# Patient Record
Sex: MALE | Race: WHITE | NOT HISPANIC OR LATINO | URBAN - METROPOLITAN AREA
[De-identification: names, ages, dates, MRNs, and addresses within clinical notes are randomized per-mention and may not be internally consistent; named-entity substitution may affect disease eponyms.]

---

## 2019-05-08 ENCOUNTER — APPOINTMENT (RX ONLY)
Dept: URBAN - METROPOLITAN AREA CLINIC 37 | Facility: CLINIC | Age: 29
Setting detail: DERMATOLOGY
End: 2019-05-08

## 2019-05-08 DIAGNOSIS — D22 MELANOCYTIC NEVI: ICD-10-CM

## 2019-05-08 DIAGNOSIS — L72.8 OTHER FOLLICULAR CYSTS OF THE SKIN AND SUBCUTANEOUS TISSUE: ICD-10-CM

## 2019-05-08 DIAGNOSIS — L82.1 OTHER SEBORRHEIC KERATOSIS: ICD-10-CM

## 2019-05-08 PROBLEM — L85.3 XEROSIS CUTIS: Status: ACTIVE | Noted: 2019-05-08

## 2019-05-08 PROBLEM — D22.5 MELANOCYTIC NEVI OF TRUNK: Status: ACTIVE | Noted: 2019-05-08

## 2019-05-08 PROCEDURE — 99203 OFFICE O/P NEW LOW 30 MIN: CPT

## 2019-05-08 NOTE — HPI: SKIN LESION
What Type Of Note Output Would You Prefer (Optional)?: Bullet Format
Has Your Skin Lesion Been Treated?: not been treated
Is This A New Presentation, Or A Follow-Up?: Skin Lesions
Is This A New Presentation, Or A Follow-Up?: Skin Lesion

## 2020-09-30 ENCOUNTER — NEW PATIENT (OUTPATIENT)
Age: 30
End: 2020-09-30

## 2020-09-30 DIAGNOSIS — H52.13: ICD-10-CM

## 2020-09-30 DIAGNOSIS — H04.123: ICD-10-CM

## 2020-09-30 PROCEDURE — 92310 CONTACT LENS FITTING OU: CPT

## 2020-09-30 PROCEDURE — 99204 OFFICE O/P NEW MOD 45 MIN: CPT

## 2020-09-30 ASSESSMENT — KERATOMETRY
OD_AXISANGLE_DEGREES: 109
OD_K2POWER_DIOPTERS: 40
OD_AXISANGLE2_DEGREES: 19
OD_K1POWER_DIOPTERS: 32.75
OS_K1POWER_DIOPTERS: 44
OS_AXISANGLE_DEGREES: 3
OS_K2POWER_DIOPTERS: 44.75
OS_AXISANGLE2_DEGREES: 93

## 2020-09-30 ASSESSMENT — VISUAL ACUITY
OD_CC: 20/20
OS_CC: 20/25

## 2020-09-30 ASSESSMENT — TONOMETRY
OD_IOP_MMHG: 14
OS_IOP_MMHG: 16

## 2021-12-20 ENCOUNTER — APPOINTMENT (RX ONLY)
Dept: URBAN - METROPOLITAN AREA CLINIC 37 | Facility: CLINIC | Age: 31
Setting detail: DERMATOLOGY
End: 2021-12-20

## 2021-12-20 DIAGNOSIS — L82.1 OTHER SEBORRHEIC KERATOSIS: ICD-10-CM

## 2021-12-20 DIAGNOSIS — D22 MELANOCYTIC NEVI: ICD-10-CM

## 2021-12-20 PROBLEM — D22.4 MELANOCYTIC NEVI OF SCALP AND NECK: Status: ACTIVE | Noted: 2021-12-20

## 2021-12-20 PROCEDURE — ? ADDITIONAL NOTES

## 2021-12-20 PROCEDURE — ? COUNSELING

## 2021-12-20 PROCEDURE — ? TREATMENT REGIMEN

## 2021-12-20 PROCEDURE — 99213 OFFICE O/P EST LOW 20 MIN: CPT

## 2021-12-20 ASSESSMENT — LOCATION ZONE DERM
LOCATION ZONE: TRUNK
LOCATION ZONE: NECK

## 2021-12-20 ASSESSMENT — LOCATION DETAILED DESCRIPTION DERM
LOCATION DETAILED: LEFT INFERIOR UPPER BACK
LOCATION DETAILED: LEFT SUPERIOR LATERAL NECK
LOCATION DETAILED: RIGHT SUPERIOR UPPER BACK

## 2021-12-20 ASSESSMENT — LOCATION SIMPLE DESCRIPTION DERM
LOCATION SIMPLE: LEFT UPPER BACK
LOCATION SIMPLE: LEFT ANTERIOR NECK
LOCATION SIMPLE: RIGHT UPPER BACK

## 2023-06-06 ENCOUNTER — ESTABLISHED COMPREHENSIVE EXAM (OUTPATIENT)
Dept: URBAN - METROPOLITAN AREA CLINIC 45 | Facility: CLINIC | Age: 33
End: 2023-06-06

## 2023-06-06 DIAGNOSIS — H01.026: ICD-10-CM

## 2023-06-06 DIAGNOSIS — H04.123: ICD-10-CM

## 2023-06-06 DIAGNOSIS — H52.13: ICD-10-CM

## 2023-06-06 DIAGNOSIS — H01.023: ICD-10-CM

## 2023-06-06 PROCEDURE — 99213 OFFICE O/P EST LOW 20 MIN: CPT

## 2023-06-06 PROCEDURE — 92310 CONTACT LENS FITTING OU: CPT

## 2023-06-06 ASSESSMENT — TONOMETRY
OS_IOP_MMHG: 16
OD_IOP_MMHG: 15

## 2023-06-06 ASSESSMENT — VISUAL ACUITY
OD_CC: 20/25-2
OS_CC: 20/20